# Patient Record
Sex: MALE | Race: WHITE | URBAN - METROPOLITAN AREA
[De-identification: names, ages, dates, MRNs, and addresses within clinical notes are randomized per-mention and may not be internally consistent; named-entity substitution may affect disease eponyms.]

---

## 2017-02-22 ENCOUNTER — HISTORICAL (OUTPATIENT)
Dept: ADMINISTRATIVE | Facility: HOSPITAL | Age: 57
End: 2017-02-22

## 2017-03-14 ENCOUNTER — HISTORICAL (OUTPATIENT)
Dept: PREADMISSION TESTING | Facility: HOSPITAL | Age: 57
End: 2017-03-14

## 2017-03-29 ENCOUNTER — HISTORICAL (OUTPATIENT)
Dept: ADMINISTRATIVE | Facility: HOSPITAL | Age: 57
End: 2017-03-29

## 2017-08-01 ENCOUNTER — HISTORICAL (OUTPATIENT)
Dept: LAB | Facility: HOSPITAL | Age: 57
End: 2017-08-01

## 2017-08-01 ENCOUNTER — HISTORICAL (OUTPATIENT)
Dept: PREADMISSION TESTING | Facility: HOSPITAL | Age: 57
End: 2017-08-01

## 2017-08-01 LAB
ABS NEUT (OLG): 4.2 X10(3)/MCL (ref 2.1–9.2)
ALBUMIN SERPL-MCNC: 4.3 GM/DL (ref 3.4–5)
ALBUMIN/GLOB SERPL: 1.2 RATIO (ref 1.1–2)
ALP SERPL-CCNC: 85 UNIT/L (ref 50–136)
ALT SERPL-CCNC: 31 UNIT/L (ref 12–78)
APTT PPP: 27.7 SECOND(S) (ref 20.6–36)
AST SERPL-CCNC: 22 UNIT/L (ref 15–37)
BASOPHILS # BLD AUTO: 0.1 X10(3)/MCL (ref 0–0.2)
BASOPHILS NFR BLD AUTO: 1 %
BILIRUB SERPL-MCNC: 1.1 MG/DL (ref 0.2–1)
BILIRUBIN DIRECT+TOT PNL SERPL-MCNC: 0.1 MG/DL (ref 0–0.5)
BILIRUBIN DIRECT+TOT PNL SERPL-MCNC: 1 MG/DL (ref 0–0.8)
BUN SERPL-MCNC: 17 MG/DL (ref 7–18)
CALCIUM SERPL-MCNC: 9.4 MG/DL (ref 8.5–10.1)
CHLORIDE SERPL-SCNC: 102 MMOL/L (ref 98–107)
CO2 SERPL-SCNC: 27 MMOL/L (ref 21–32)
CREAT SERPL-MCNC: 0.91 MG/DL (ref 0.7–1.3)
EOSINOPHIL # BLD AUTO: 0.2 X10(3)/MCL (ref 0–0.9)
EOSINOPHIL NFR BLD AUTO: 3 %
ERYTHROCYTE [DISTWIDTH] IN BLOOD BY AUTOMATED COUNT: 12.5 % (ref 11.5–17)
GLOBULIN SER-MCNC: 3.5 GM/DL (ref 2.4–3.5)
GLUCOSE SERPL-MCNC: 100 MG/DL (ref 74–106)
HCT VFR BLD AUTO: 46.4 % (ref 42–52)
HGB BLD-MCNC: 15 GM/DL (ref 14–18)
INR PPP: 0.98 (ref 0–1.27)
LYMPHOCYTES # BLD AUTO: 2.6 X10(3)/MCL (ref 0.6–4.6)
LYMPHOCYTES NFR BLD AUTO: 34 %
MCH RBC QN AUTO: 30.2 PG (ref 27–31)
MCHC RBC AUTO-ENTMCNC: 32.3 GM/DL (ref 33–36)
MCV RBC AUTO: 93.5 FL (ref 80–94)
MONOCYTES # BLD AUTO: 0.6 X10(3)/MCL (ref 0.1–1.3)
MONOCYTES NFR BLD AUTO: 8 %
NEUTROPHILS # BLD AUTO: 4.2 X10(3)/MCL (ref 2.1–9.2)
NEUTROPHILS NFR BLD AUTO: 55 %
PLATELET # BLD AUTO: 213 X10(3)/MCL (ref 130–400)
PMV BLD AUTO: 10.5 FL (ref 9.4–12.4)
POTASSIUM SERPL-SCNC: 4.2 MMOL/L (ref 3.5–5.1)
PROT SERPL-MCNC: 7.8 GM/DL (ref 6.4–8.2)
PROTHROMBIN TIME: 12.8 SECOND(S) (ref 12.1–14.2)
RBC # BLD AUTO: 4.96 X10(6)/MCL (ref 4.7–6.1)
SODIUM SERPL-SCNC: 140 MMOL/L (ref 136–145)
WBC # SPEC AUTO: 7.7 X10(3)/MCL (ref 4.5–11.5)

## 2017-08-08 ENCOUNTER — HISTORICAL (OUTPATIENT)
Dept: SURGERY | Facility: HOSPITAL | Age: 57
End: 2017-08-08

## 2017-10-12 ENCOUNTER — HISTORICAL (OUTPATIENT)
Dept: LAB | Facility: HOSPITAL | Age: 57
End: 2017-10-12

## 2017-10-12 LAB
ABS NEUT (OLG): 3.74 X10(3)/MCL (ref 2.1–9.2)
ALBUMIN SERPL-MCNC: 4.2 GM/DL (ref 3.4–5)
ALBUMIN/GLOB SERPL: 1.1 RATIO (ref 1.1–2)
ALP SERPL-CCNC: 79 UNIT/L (ref 50–136)
ALT SERPL-CCNC: 35 UNIT/L (ref 12–78)
APTT PPP: 27.5 SECOND(S) (ref 24.8–36.9)
AST SERPL-CCNC: 25 UNIT/L (ref 15–37)
BASOPHILS # BLD AUTO: 0.1 X10(3)/MCL (ref 0–0.2)
BASOPHILS NFR BLD AUTO: 1 %
BILIRUB SERPL-MCNC: 0.4 MG/DL (ref 0.2–1)
BILIRUBIN DIRECT+TOT PNL SERPL-MCNC: 0.1 MG/DL (ref 0–0.5)
BILIRUBIN DIRECT+TOT PNL SERPL-MCNC: 0.3 MG/DL (ref 0–0.8)
BUN SERPL-MCNC: 13 MG/DL (ref 7–18)
CALCIUM SERPL-MCNC: 9.5 MG/DL (ref 8.5–10.1)
CHLORIDE SERPL-SCNC: 100 MMOL/L (ref 98–107)
CO2 SERPL-SCNC: 32 MMOL/L (ref 21–32)
CREAT SERPL-MCNC: 0.84 MG/DL (ref 0.7–1.3)
EOSINOPHIL # BLD AUTO: 0.2 X10(3)/MCL (ref 0–0.9)
EOSINOPHIL NFR BLD AUTO: 3 %
ERYTHROCYTE [DISTWIDTH] IN BLOOD BY AUTOMATED COUNT: 12.3 % (ref 11.5–17)
GLOBULIN SER-MCNC: 3.8 GM/DL (ref 2.4–3.5)
GLUCOSE SERPL-MCNC: 93 MG/DL (ref 74–106)
HCT VFR BLD AUTO: 46.3 % (ref 42–52)
HGB BLD-MCNC: 15.9 GM/DL (ref 14–18)
INR PPP: 0.93 (ref 0–1.27)
LYMPHOCYTES # BLD AUTO: 2.5 X10(3)/MCL (ref 0.6–4.6)
LYMPHOCYTES NFR BLD AUTO: 36 %
MCH RBC QN AUTO: 33.5 PG (ref 27–31)
MCHC RBC AUTO-ENTMCNC: 34.3 GM/DL (ref 33–36)
MCV RBC AUTO: 97.7 FL (ref 80–94)
MONOCYTES # BLD AUTO: 0.5 X10(3)/MCL (ref 0.1–1.3)
MONOCYTES NFR BLD AUTO: 7 %
NEUTROPHILS # BLD AUTO: 3.74 X10(3)/MCL (ref 1.4–7.9)
NEUTROPHILS NFR BLD AUTO: 53 %
PLATELET # BLD AUTO: 201 X10(3)/MCL (ref 130–400)
PMV BLD AUTO: 10.2 FL (ref 9.4–12.4)
POTASSIUM SERPL-SCNC: 4.5 MMOL/L (ref 3.5–5.1)
PROT SERPL-MCNC: 8 GM/DL (ref 6.4–8.2)
PROTHROMBIN TIME: 12.3 SECOND(S) (ref 12.2–14.7)
RBC # BLD AUTO: 4.74 X10(6)/MCL (ref 4.7–6.1)
SODIUM SERPL-SCNC: 137 MMOL/L (ref 136–145)
WBC # SPEC AUTO: 7.1 X10(3)/MCL (ref 4.5–11.5)

## 2018-04-09 ENCOUNTER — HISTORICAL (OUTPATIENT)
Dept: SURGERY | Facility: HOSPITAL | Age: 58
End: 2018-04-09

## 2018-05-09 ENCOUNTER — HISTORICAL (OUTPATIENT)
Dept: ADMINISTRATIVE | Facility: HOSPITAL | Age: 58
End: 2018-05-09

## 2022-04-10 ENCOUNTER — HISTORICAL (OUTPATIENT)
Dept: ADMINISTRATIVE | Facility: HOSPITAL | Age: 62
End: 2022-04-10

## 2022-04-24 VITALS
BODY MASS INDEX: 38.86 KG/M2 | DIASTOLIC BLOOD PRESSURE: 86 MMHG | SYSTOLIC BLOOD PRESSURE: 144 MMHG | WEIGHT: 233.25 LBS | HEIGHT: 65 IN

## 2022-04-30 NOTE — OP NOTE
Patient:   Tung Jiménez            MRN: [id number]            FIN: [id number]               Age:   58 years     Sex:  Male     :  1960   Associated Diagnoses:   Sacroiliitis   Author:   VICKI Sandhu MD      Operative Note   Operative Information   Postoperative Diagnosis: Sacroiliitis ([id number] M46.1).     Surgeon: VICKI Sandhu MD.     Assistant: Imer Segundo     Anesthesia: IV Sedation.     Speciman Removed: None.     Description of Procedure/Findings/    Complications: After appropriate operative consents were obtained the patient was taken the operating room and placed on the x-ray table in the prone position.  IV sedation was induced by anesthesia without difficulty.  Under direct image  intensification the Right SI joint was localized.  The skin was anesthetized with 3 cc of half percent Marcaine.  A 20-gauge spinal needle was inserted into the Right SI joint under direct image intensification.  The placement was verified with half-strength Omnipaque dye.  1 cc of Kenalog 40 with 2 cc of Marcaine were instilled into the Right SI joint.    The patient tolerated the injection without difficulty, was awakened from anesthesia and transferred to 1 day stay in stable condition..

## 2022-04-30 NOTE — OP NOTE
DATE OF SURGERY:    08/08/2017    SURGEON:  Wayne Patel MD    PREOPERATIVE DIAGNOSES:    1. Lumbar spinal stenosis.  2. Lumbar radiculopathy.  3. Lumbar degenerative disk disease.  4. Post laminectomy syndrome.  5. Low back pain.    POSTOPERATIVE DIAGNOSES:    1. Lumbar spinal stenosis.  2. Lumbar radiculopathy.  3. Lumbar degenerative disk disease.  4. Post laminectomy syndrome.  5. Low back pain.    PROCEDURE:    1. Left L2 transforaminal epidural steroid injection.  2. Right L3 transforaminal epidural steroid injection.    INDICATIONS FOR PROCEDURE:  The patient is a 57-year-old male who presented to my clinic with complaints of bilateral lower extremity radiculopathy, left greater than right, extending down into his feet.  He had previously undergone spinal surgery as well as medication administration.  He elected to undergo conservative management in the form of epidural steroid injection.    PROCEDURE IN DETAIL:  After informed consent was obtained, patient was brought to procedure room, placed prone on the procedure table, appropriately padded, prepped and draped in usual sterile fashion.  Placed under anesthesia by the Anesthesia Team.       I began the procedure by, under live AP and lateral fluoroscopy, passing a 22 gauge spinal needle into the right L3 foramen.  Radiopaque contrast injection showed appropriate epidural flow pattern.  I then injected 2 cc of 4 mg/mL Decadron solution mixed with 1 mL preservative-free saline into the epidural space.  The needle was withdrawn.     I then attempted to perform a left L4 transforaminal epidural steroid injection; however, secondary to bony hypertrophy the anatomy could not be reached.  L3 subsequently could not be observed because the previously implanted bone growth stimulator was in the trajectory.  Therefore, I went to L2 on the left.  Under live AP and lateral fluoroscopy, I passed a 22 gauge spinal needle into the left L2 foramen.  Radiopaque  contrast injection showed appropriate epidural flow pattern.  I then injected 2 cc of 4 mg/mL Decadron solution mixed with 1 mL preservative free saline into the epidural space.  The needle was withdrawn.  No apparent complications.        ______________________________  Wayne Patel MD    [facility]/UD  DD:  08/08/2017  Time:  12:50PM  DT:  08/08/2017  Time:  06:59PM  Job #:  [id number]

## 2022-05-03 NOTE — HISTORICAL [facility] CERNER
This is a historical note converted from Gabriella. Formatting and pictures may have been removed.  Please reference Gabriella for original formatting and attached multimedia. Chief Complaint  RT SI joint injection postop, Sx - 4/9/18. Pt states he has improved greatly since injection. Now having RT foot pain  History of Present Illness  This 58-year-old returns for his right SI joint. ?He states that he is completely relieved of pain since his injection.? Because he has been relieved of pain he has increased the amount of walking and outdoor activities that he is doing.? He states in the last 3 days he developed foot pain.? He points to his third metatarsal as the source of pain.  Review of Systems  Constitutional: No fever, weakness, or fatigue.  Ear/Nose/Mouth/Throat: No nasal congestion or sore throat.  Respiratory: No shortness of breath or cough.  Cardiovascular: No chest pain, palpitations, or peripheral edema.  Gastrointestinal: No nausea, vomiting, or abdominal pain.  Genitourinary: No dysuria.  Musculoskeletal: See current complaints; chronic low back pain  Integumentary: Negative.  Physical Exam  Vitals & Measurements  HR:?63(Peripheral)? BP:?144/86?  HT:?165?cm? HT:?165?cm? WT:?105.8?kg? WT:?105.8?kg? BMI:?38.86?  Physical examination shows that the patients gait is much improved.? He is no longer tender on his right SI joint.? He has a negative Fabers test.? Examination of his right foot shows that the patient has a deviation of the second toe.? He states that this has existed since?a heavy piece of metal was dropped on his foot and he has a laceration between his second and third toes.? He is tender at the metatarsal neck of the third metatarsal?in addition to?the third MTP joint.? It is a little difficult to tell but I do not believe that he has tenderness in the web space?or the interspace between the second and third metatarsals.? He does have stiffness noted at his?great toe?with?50% decreased range of  motion.? He has a claw toe that is deviated medially with his second toe.? His?claw toe on the second toe is quite stiff.? He has?no other gross abnormalities noted.? Pulses are 2+ and symmetrical. ?He has no instability.  ?  AP lateral and?oblique weightbearing of the right foot shows that?he has significant?arthritic changes in his first MTP joint.? He has medial deviation of the second?toe?at the MTP joint.? Otherwise his x-rays?are negative.? He has obviously had previous?surgery at his calcaneus with multiple anchors consistent with an Achilles tendon surgery.  Assessment/Plan  1.?Sacroiliitis  ? The findings were reviewed with the patient and he expressed understanding.? I believe that he is transferring weight to the lateral side of his foot secondary to his first MTP joint arthritis and second toe deformity.? He is also more active now that his pain has decreased.? He has obtained an over-the-counter orthotic.? He would like to see how he does and call me if he needs anything further.  Ordered:  Office/Outpatient Visit Level 3 Established [id number] PC, Sacroiliitis  Pain in right foot  Primary osteoarthritis of right foot, [facility] [place], 05/09/18 10:23:00 CDT  ?  2.?Pain in right foot  Ordered:  Office/Outpatient Visit Level 3 Established [id number] PC, Sacroiliitis  Pain in right foot  Primary osteoarthritis of right foot, [facility] [place], 05/09/18 10:23:00 CDT  XR Foot Right Minimum 3 Views, Routine, 05/09/18 10:08:00 CDT, PAIN, None, Ambulatory, Rad Type, Pain in right foot, 05/09/18 10:08:00 CDT  ?  3.?Primary osteoarthritis of right foot  Ordered:  Office/Outpatient Visit Level 3 Established [id number] PC, Sacroiliitis  Pain in right foot  Primary osteoarthritis of right foot, [facility] [place], 05/09/18 10:23:00 CDT  ?  Orders:  Clinic Follow-up PRN, 05/09/18 10:23:00 CDT, Future Order, [facility] [place]   Problem List/Past Medical History  Ongoing  Acid reflux  Arthritis  Back  pain  Contact lens  Depression  Hyperlipidaemia  Hypertension  Lumbar radiculopathy  Lumbar spondylosis  Numbness of fingers  Obesity  Pain in left knee  Pain in right foot  Presence of left artificial knee joint  Primary osteoarthritis of left knee  Primary osteoarthritis of right foot  Sacroiliitis  Tobacco user  Historical  Hx of fall  Mumps  Orthopedic aftercare  Wears glasses  Procedure/Surgical History  Injection procedure for sacroiliac joint; arthrography (04/09/2018), Injection Sacroiliac Joint (Right) (04/09/2018), Introduction of Anesthetic Agent into Joints, Percutaneous Approach (04/09/2018), Fusion of 2 or more Lumbar Vertebral Joints with Autologous Tissue Substitute, Posterior Approach, Anterior Column, Open Approach (10/26/2017), Lumbar Fusion With O-Arm (.) (10/26/2017), Removal of Bone Growth Stimulator from Lower Bone, Open Approach (10/26/2017), Injection Transforamin Epidural Steroid (None) (08/08/2017), Injection(s), anesthetic agent and/or steroid, transforaminal epidural, with imaging guidance (fluoroscopy or CT); lumbar or sacral, single level (08/08/2017), Introduction of Anti-inflammatory into Spinal Canal, Percutaneous Approach (08/08/2017), Replacement of Left Knee Joint with Synthetic Substitute, Cemented, Open Approach (03/20/2017), Replacement of Left Knee Joint with Synthetic Substitute, Uncemented, Open Approach (03/20/2017), Total Knee Arthroplasty (Left) (03/20/2017), Achilles tendon repair, Laparoscopy, surgical, repair, ventral, umbilical, spigelian or epigastric hernia (includes mesh insertion, when performed); incarcerated or strangulated, Lumbar spinal fusion, Rotator cuff repair.  Medications  Aspir-Low 81 mg oral delayed release tablet, 81 mg= 1 tab(s), Oral, Daily  benazepril 40 mg oral tablet, 40 mg= 1 tab(s), Oral, Daily  citalopram 40 mg oral tablet, 40 mg= 1 tab(s), Oral, Daily  Norco 10 mg-325 mg oral tablet, 1 tab(s), Oral, q6hr, PRN  omeprazole 20 mg oral DR  capsule, 20 mg= 1 cap(s), Oral, BID  phentermine 37.5 mg oral capsule, 37.5 mg= 1 cap(s), Oral, Daily  simvastatin 40 mg oral tablet, 40 mg= 1 tab(s), Oral, Daily  verapamil 180 mg/12 hours oral tablet, extended release, 180 mg= 1 tab(s), Oral, BID  Allergies  penicillins?(RASH)  Social History  Alcohol  Current, Beer, 3-5 times per week, 5 drinks/episode average., 03/12/2017  Employment/School  DISABLED, Highest education level: High school., 03/12/2017  Unemployed, 02/22/2017  Home/Environment  Lives with Spouse. Living situation: Home/Independent., 03/12/2017  Nutrition/Health  Regular, 03/12/2017  Sexual  Sexually active: Yes., 03/12/2017  Substance Abuse  Never, 02/22/2017  Tobacco  Former smoker, Oral, 04/09/2018  Family History  Alcoholism.: Father, Sister and Brother.  Depression.: Mother.  Hypertension.: Mother, Father and Brother.